# Patient Record
Sex: FEMALE | Race: ASIAN | URBAN - METROPOLITAN AREA
[De-identification: names, ages, dates, MRNs, and addresses within clinical notes are randomized per-mention and may not be internally consistent; named-entity substitution may affect disease eponyms.]

---

## 2022-10-31 ENCOUNTER — APPOINTMENT (OUTPATIENT)
Age: 47
Setting detail: DERMATOLOGY
End: 2022-10-31

## 2022-10-31 DIAGNOSIS — L57.8 OTHER SKIN CHANGES DUE TO CHRONIC EXPOSURE TO NONIONIZING RADIATION: ICD-10-CM

## 2022-10-31 DIAGNOSIS — L81.4 OTHER MELANIN HYPERPIGMENTATION: ICD-10-CM

## 2022-10-31 PROCEDURE — OTHER TREATMENT REGIMEN: OTHER

## 2022-10-31 PROCEDURE — OTHER LIQUID NITROGEN (COSMETIC): OTHER

## 2022-10-31 PROCEDURE — 99202 OFFICE O/P NEW SF 15 MIN: CPT

## 2022-10-31 PROCEDURE — OTHER COUNSELING: OTHER

## 2022-10-31 PROCEDURE — OTHER FOLLOW UP FOR NEXT VISIT: OTHER

## 2022-10-31 PROCEDURE — OTHER SUNSCREEN RECOMMENDATIONS: OTHER

## 2022-10-31 PROCEDURE — OTHER REASSURANCE: OTHER

## 2022-10-31 ASSESSMENT — LOCATION SIMPLE DESCRIPTION DERM
LOCATION SIMPLE: LEFT CHEEK
LOCATION SIMPLE: RIGHT CHEEK
LOCATION SIMPLE: LEFT ZYGOMA
LOCATION SIMPLE: LEFT FOREHEAD

## 2022-10-31 ASSESSMENT — LOCATION DETAILED DESCRIPTION DERM
LOCATION DETAILED: LEFT CENTRAL MALAR CHEEK
LOCATION DETAILED: LEFT SUPERIOR CENTRAL MALAR CHEEK
LOCATION DETAILED: LEFT INFERIOR MEDIAL FOREHEAD
LOCATION DETAILED: LEFT CENTRAL ZYGOMA
LOCATION DETAILED: RIGHT CENTRAL MALAR CHEEK

## 2022-10-31 ASSESSMENT — SEVERITY ASSESSMENT: SEVERITY: MILD

## 2022-10-31 ASSESSMENT — LOCATION ZONE DERM: LOCATION ZONE: FACE

## 2022-10-31 ASSESSMENT — BSA RASH: BSA RASH: 2

## 2022-10-31 NOTE — PROCEDURE: LIQUID NITROGEN (COSMETIC)
Render Post-Care Instructions In Note?: yes
Consent: The patient's consent was obtained including but not limited to risks of crusting, scabbing, blistering, scarring, darker or lighter pigmentary change, recurrence, incomplete removal and infection. The patient understands that the procedure is cosmetic in nature and is not covered by insurance.
Post-Care Instructions: I reviewed with the patient in detail post-care instructions. Patient is to wear sunprotection, and avoid picking at any of the treated lesions. Pt may apply Vaseline to crusted or scabbing areas.
Price (Use Numbers Only, No Special Characters Or $): 84
Detail Level: Simple
Billing Information: Bill by Static Price
Spray Paint Text: The liquid nitrogen was applied to the skin utilizing a spray paint frosting technique.
Spray Paint Technique: No

## 2022-10-31 NOTE — HPI: SKIN LESION
What Type Of Note Output Would You Prefer (Optional)?: Bullet Format
How Severe Is Your Skin Lesion?: moderate
Has Your Skin Lesion Been Treated?: not been treated
Is This A New Presentation, Or A Follow-Up?: Skin Lesions
Additional History: Patient presents for dark spots on her face.  She would like to have them evaluated and discuss possible treatment options. She has trialed topical regimens and states they lighten the lesions for a little bit but they always come back.

## 2022-10-31 NOTE — PROCEDURE: REASSURANCE
Include Location In Plan?: Yes
Detail Level: Zone
Hide Additional Notes?: No
Additional Note: Patient counseled regarding sunscreen and how to use it properly. Follow up if there are any changes.

## 2022-11-14 ENCOUNTER — APPOINTMENT (OUTPATIENT)
Age: 47
Setting detail: DERMATOLOGY
End: 2022-11-18

## 2022-11-14 DIAGNOSIS — L81.4 OTHER MELANIN HYPERPIGMENTATION: ICD-10-CM

## 2022-11-14 PROCEDURE — OTHER FOLLOW UP FOR NEXT VISIT: OTHER

## 2022-11-14 PROCEDURE — OTHER COUNSELING: OTHER

## 2022-11-14 PROCEDURE — 17110 DESTRUCT B9 LESION 1-14: CPT

## 2022-11-14 PROCEDURE — OTHER TREATMENT REGIMEN: OTHER

## 2022-11-14 PROCEDURE — OTHER LIQUID NITROGEN: OTHER

## 2022-11-14 ASSESSMENT — LOCATION ZONE DERM: LOCATION ZONE: FACE

## 2022-11-14 ASSESSMENT — LOCATION SIMPLE DESCRIPTION DERM
LOCATION SIMPLE: LEFT TEMPLE
LOCATION SIMPLE: RIGHT CHEEK
LOCATION SIMPLE: LEFT CHEEK
LOCATION SIMPLE: LEFT EYEBROW

## 2022-11-14 ASSESSMENT — LOCATION DETAILED DESCRIPTION DERM
LOCATION DETAILED: RIGHT MEDIAL MALAR CHEEK
LOCATION DETAILED: LEFT INFERIOR TEMPLE
LOCATION DETAILED: LEFT LATERAL EYEBROW
LOCATION DETAILED: LEFT CENTRAL MALAR CHEEK
LOCATION DETAILED: RIGHT LATERAL MALAR CHEEK
LOCATION DETAILED: LEFT SUPERIOR CENTRAL MALAR CHEEK
LOCATION DETAILED: RIGHT CENTRAL MALAR CHEEK

## 2022-11-14 ASSESSMENT — SEVERITY ASSESSMENT: SEVERITY: MILD

## 2022-11-14 NOTE — PROCEDURE: LIQUID NITROGEN
Spray Paint Technique: No
Duration Of Freeze Thaw-Cycle (Seconds): 4
Show Applicator Variable?: Yes
Number Of Freeze-Thaw Cycles: 1 freeze-thaw cycle
Medical Necessity Clause: This procedure was medically necessary because the lesions that were treated were: enlarging and irritated
Detail Level: Detailed
Aperture Size (Optional): D
Post-Care Instructions: I reviewed with the patient in detail post-care instructions. Patient is to wear sunprotection, and avoid picking at any of the treated lesions. Pt may apply Vaseline to crusted or scabbing areas.
Consent: The patient's consent was obtained including but not limited to risks of crusting, scabbing, blistering, scarring, darker or lighter pigmentary change, recurrence, incomplete removal and infection.
Spray Paint Text: The liquid nitrogen was applied to the skin utilizing a spray paint frosting technique.
Application Tool (Optional): Cry-AC
Medical Necessity Information: It is in your best interest to select a reason for this procedure from the list below. All of these items fulfill various CMS LCD requirements except the new and changing color options.

## 2022-11-14 NOTE — PROCEDURE: TREATMENT REGIMEN
Plan: The smaller lesions on her face were treated with LN2 in office today.  She will see how the lesions do and let the office know. A smaller tip was used on the LN2 treatment today so the dosing was very light.  If the cryotherapy does not work well I would recommend she follow up on a day where we can perform the consultation and treatment at the same time
Initiate Treatment: LN2
Detail Level: Simple

## 2022-11-28 ENCOUNTER — APPOINTMENT (OUTPATIENT)
Age: 47
Setting detail: DERMATOLOGY
End: 2022-11-28

## 2022-11-28 DIAGNOSIS — L81.4 OTHER MELANIN HYPERPIGMENTATION: ICD-10-CM

## 2022-11-28 PROCEDURE — OTHER COUNSELING: OTHER

## 2022-11-28 PROCEDURE — OTHER INVENTORY: OTHER

## 2022-11-28 PROCEDURE — OTHER FOLLOW UP FOR NEXT VISIT: OTHER

## 2022-11-28 PROCEDURE — OTHER TREATMENT REGIMEN: OTHER

## 2022-11-28 PROCEDURE — 99212 OFFICE O/P EST SF 10 MIN: CPT

## 2022-11-28 ASSESSMENT — BSA RASH: BSA RASH: 1

## 2022-11-28 ASSESSMENT — SEVERITY ASSESSMENT: SEVERITY: MILD

## 2022-11-28 NOTE — PROCEDURE: TREATMENT REGIMEN
Plan: As patient has not been seeing much improvement with cryotherapy, she will start using topical melasma emulsion 2-3 nights weekly increasing to nightly as tolerated. Also briefly discussed partial face IPL treatment as patient has enough target across cheeks.
Detail Level: Simple
Initiate Treatment: Melasma emulsion

## 2023-03-15 ENCOUNTER — APPOINTMENT (OUTPATIENT)
Age: 48
Setting detail: DERMATOLOGY
End: 2023-03-19

## 2023-03-15 DIAGNOSIS — L71.8 OTHER ROSACEA: ICD-10-CM

## 2023-03-15 PROCEDURE — OTHER FOLLOW UP FOR NEXT VISIT: OTHER

## 2023-03-15 PROCEDURE — OTHER INVENTORY: OTHER

## 2023-03-15 PROCEDURE — 99213 OFFICE O/P EST LOW 20 MIN: CPT

## 2023-03-15 PROCEDURE — OTHER COUNSELING: OTHER

## 2023-03-15 PROCEDURE — OTHER TREATMENT REGIMEN: OTHER

## 2023-03-15 ASSESSMENT — LOCATION SIMPLE DESCRIPTION DERM
LOCATION SIMPLE: LEFT CHEEK
LOCATION SIMPLE: RIGHT CHEEK

## 2023-03-15 ASSESSMENT — LOCATION DETAILED DESCRIPTION DERM
LOCATION DETAILED: RIGHT CENTRAL MALAR CHEEK
LOCATION DETAILED: LEFT CENTRAL MALAR CHEEK

## 2023-03-15 ASSESSMENT — SEVERITY ASSESSMENT OVERALL AMONG ALL PATIENTS
IN YOUR EXPERIENCE, AMONG ALL PATIENTS YOU HAVE SEEN WITH THIS CONDITION, HOW SEVERE IS THIS PATIENT'S CONDITION?: MILD TO MODERATE

## 2023-03-15 ASSESSMENT — LOCATION ZONE DERM: LOCATION ZONE: FACE

## 2023-03-15 NOTE — PROCEDURE: TREATMENT REGIMEN
Detail Level: Zone
Discontinue Regimen: Clobetasol
Initiate Treatment: Rosacea compound
Plan: We discussed this appears to be consistent with rosacea. It is unlikely that the melasma compound was the cause of this, but it could be an irritant. Patient will begin in office rosacea compound BID. We discussed that she needs to begin weaning off of the clobetasol topical. She will begin to use the clobetasol only a few times a week until she can discontinue it. She will hold the melasma compound for now until the rosacea is under control, then she can begin using it again. Patient will follow up in 1 month to update us on her progress.

## 2023-03-15 NOTE — HPI: RASH
What Type Of Note Output Would You Prefer (Optional)?: Bullet Format
Is The Patient Presenting As Previously Scheduled?: Yes
How Severe Is Your Rash?: moderate
Is This A New Presentation, Or A Follow-Up?: Rash
Additional History: Patient reports that about 2 weeks after receiving our in office melasma compound, she began to get a red rash on her face. She then saw her PCP who prescribed her clobetasol. She uses this about for a couple days and her rash clears, but then it will come back. She uses the clobetasol cream about 4-5 times a week. She continues to use the melasma compound and reports it is working very well. She reports the rash is very red and burning and can feel very hot on her face.

## 2023-04-05 ENCOUNTER — RX ONLY (RX ONLY)
Age: 48
End: 2023-04-05

## 2023-04-05 RX ORDER — METRONIDAZOLE 7.5 MG/G
THIN COAT CREAM TOPICAL BID
Qty: 45 | Refills: 1 | Status: ERX | COMMUNITY
Start: 2023-04-05

## 2023-08-21 ENCOUNTER — APPOINTMENT (OUTPATIENT)
Age: 48
Setting detail: DERMATOLOGY
End: 2023-08-23

## 2023-08-21 DIAGNOSIS — I78.8 OTHER DISEASES OF CAPILLARIES: ICD-10-CM

## 2023-08-21 DIAGNOSIS — L71.8 OTHER ROSACEA: ICD-10-CM

## 2023-08-21 PROCEDURE — OTHER COUNSELING: OTHER

## 2023-08-21 PROCEDURE — OTHER TREATMENT REGIMEN: OTHER

## 2023-08-21 PROCEDURE — 99213 OFFICE O/P EST LOW 20 MIN: CPT

## 2023-08-21 PROCEDURE — OTHER COSMETIC CONSULTATION: IPL: OTHER

## 2023-08-21 PROCEDURE — OTHER FOLLOW UP FOR NEXT VISIT: OTHER

## 2023-08-21 ASSESSMENT — LOCATION DETAILED DESCRIPTION DERM
LOCATION DETAILED: RIGHT INFERIOR CENTRAL MALAR CHEEK
LOCATION DETAILED: LEFT CENTRAL MALAR CHEEK
LOCATION DETAILED: LEFT DISTAL POSTERIOR UPPER ARM
LOCATION DETAILED: RIGHT CENTRAL MALAR CHEEK
LOCATION DETAILED: RIGHT PROXIMAL POSTERIOR UPPER ARM
LOCATION DETAILED: LEFT INFERIOR CENTRAL MALAR CHEEK

## 2023-08-21 ASSESSMENT — LOCATION SIMPLE DESCRIPTION DERM
LOCATION SIMPLE: RIGHT POSTERIOR UPPER ARM
LOCATION SIMPLE: LEFT CHEEK
LOCATION SIMPLE: LEFT POSTERIOR UPPER ARM
LOCATION SIMPLE: RIGHT CHEEK

## 2023-08-21 ASSESSMENT — LOCATION ZONE DERM
LOCATION ZONE: ARM
LOCATION ZONE: FACE

## 2023-08-21 ASSESSMENT — SEVERITY ASSESSMENT OVERALL AMONG ALL PATIENTS: IN YOUR EXPERIENCE, AMONG ALL PATIENTS YOU HAVE SEEN WITH THIS CONDITION, HOW SEVERE IS THIS PATIENT'S CONDITION?: MILD

## 2023-08-21 NOTE — PROCEDURE: TREATMENT REGIMEN
Detail Level: Zone
Plan: Patient will start with partial face IPL for $900. This will buy her a package of four treatments. We may consider doing treatment to the vessels on the arms as well, but she’s going to start with the face first.
Plan: Patient with mild telangiectasias on today’s clinical exam. However, she does describe regular and episodic flareups with increased redness. The only thing I think that might help would be IPL treatment. She will set up a series of four treatments, partial face for a total of $900. We would likely start with in motion, and maybe some very conservative stamping for her first treatment. She is aware she needs to avoid direct sunlight and increased heat in particular in the first week or two after treatment.
Detail Level: Simple

## 2023-08-25 ENCOUNTER — APPOINTMENT (OUTPATIENT)
Age: 48
Setting detail: DERMATOLOGY
End: 2023-08-26

## 2023-08-25 DIAGNOSIS — I78.8 OTHER DISEASES OF CAPILLARIES: ICD-10-CM

## 2023-08-25 PROCEDURE — OTHER FOLLOW UP FOR NEXT VISIT: OTHER

## 2023-08-25 PROCEDURE — OTHER ALMA LASER: OTHER

## 2023-08-25 PROCEDURE — OTHER TREATMENT REGIMEN: OTHER

## 2023-08-25 PROCEDURE — OTHER COUNSELING: OTHER

## 2023-08-25 ASSESSMENT — LOCATION DETAILED DESCRIPTION DERM
LOCATION DETAILED: NASAL SUPRATIP
LOCATION DETAILED: RIGHT INFERIOR CENTRAL MALAR CHEEK
LOCATION DETAILED: LEFT CENTRAL MALAR CHEEK
LOCATION DETAILED: LEFT INFERIOR CENTRAL MALAR CHEEK
LOCATION DETAILED: LEFT DISTAL POSTERIOR UPPER ARM
LOCATION DETAILED: RIGHT PROXIMAL POSTERIOR UPPER ARM
LOCATION DETAILED: RIGHT CENTRAL MALAR CHEEK

## 2023-08-25 ASSESSMENT — LOCATION SIMPLE DESCRIPTION DERM
LOCATION SIMPLE: LEFT CHEEK
LOCATION SIMPLE: RIGHT POSTERIOR UPPER ARM
LOCATION SIMPLE: LEFT POSTERIOR UPPER ARM
LOCATION SIMPLE: NOSE
LOCATION SIMPLE: RIGHT CHEEK

## 2023-08-25 ASSESSMENT — LOCATION ZONE DERM
LOCATION ZONE: ARM
LOCATION ZONE: NOSE
LOCATION ZONE: FACE

## 2023-08-25 NOTE — PROCEDURE: ALMA LASER
Er:Yag Post-Care Nail Fungus: Post care reviewed with patient.
Area In Cm^2: 1
Frequency In Hz: Unibody
Passes (Optional): 6
Pulse Mode (Optional): L
Consent: Written consent obtained, risks reviewed including but not limited to crusting, scabbing, blistering, scarring, darker or lighter pigmentary change, systemic reactions, ulceration, incidental hair removal, bruising, and/or incomplete removal.
Passes (Optional): 2
Fluence (J/Cm2): 8
Post-Care Instructions: I reviewed with the patient in detail post-care instructions. Patient should avoid sunlight and wear sun protection.
Nd:Yag Post-Care: Immediate endpoint whitening and pinpoint bleeding. Vaseline and ice applied. Post care reviewed with patient.
Plasma Intensity (%): 25
Fluence (Mj/Cm2): 4
Ipl Post-Care: Phytocorrective serum and Mineral SS applied. Post care reviewed with patient.
Frequency (Optional): 2 Hz
Pulse Width: 30 ms
Energy In Kj: 50
Energy In Mj: 400
Energy (Mj/P): 600
Total Energy (Kj): 6.1
Pulse Duration In Ms: 15
Price (Use Numbers Only, No Special Characters Or $): 705
Spot Size In Mm: 3
Detail Level: Simple
Fluence (J/Cm2): 30
Post-Care To Use?: Generic
Clear Lift Post-Care: Post care reviewed with patient. Patient should avoid direct sunlight and wear broad spectrum sunscreen daily.

## 2023-08-25 NOTE — PROCEDURE: COUNSELING
Patient Specific Counseling (Will Not Stick From Patient To Patient): We discussed the risks and benefits of intense pulsed light treatment including but not limited to lightening or darkening of treated skin, crusting, scabbing, scarring, reactivation of cold sores, infection, redness,  accidental hair removal, swelling and bruising. Strict sun avoidance emphasized. Patient understands that multiple treatments may be necessary, and not all unwanted brown spots can be removed. Patient aware of alternatives to IPL, including q switched lasers, bleaching creams, chemical peels, retinoids, cryotherapy, and fraxel. Patient understands that the treatment is cosmetic in nature and not covered by insurance.
Detail Level: Simple

## 2023-08-25 NOTE — PROCEDURE: TREATMENT REGIMEN
Plan: Partial Face IPL #1. Patient tolerated the procedure well with minimal discomfort. We treated both cheeks in motion as well as two passes with stamping. We treated the nose area with only stamping.  In Motion: 30s intervals, Fluence 2.6 J, Total energy per cheek 2.1KJ. Cooling 100%. Stamping: Fluence 6J, PW 15, Cooling 100%. 2 passes to cheeks and nose. Follow up in 4 weeks for IPL #2
Detail Level: Simple

## 2023-09-22 ENCOUNTER — APPOINTMENT (OUTPATIENT)
Age: 48
Setting detail: DERMATOLOGY
End: 2023-09-22

## 2023-09-22 DIAGNOSIS — I78.8 OTHER DISEASES OF CAPILLARIES: ICD-10-CM

## 2023-09-22 PROCEDURE — OTHER TREATMENT REGIMEN: OTHER

## 2023-09-22 PROCEDURE — OTHER ALMA LASER: OTHER

## 2023-09-22 PROCEDURE — OTHER FOLLOW UP FOR NEXT VISIT: OTHER

## 2023-09-22 PROCEDURE — OTHER COUNSELING: OTHER

## 2023-09-22 ASSESSMENT — LOCATION DETAILED DESCRIPTION DERM
LOCATION DETAILED: RIGHT CHIN
LOCATION DETAILED: LEFT CENTRAL MALAR CHEEK
LOCATION DETAILED: LEFT ANTERIOR EARLOBE
LOCATION DETAILED: GLABELLA
LOCATION DETAILED: RIGHT CENTRAL MALAR CHEEK
LOCATION DETAILED: NASAL SUPRATIP
LOCATION DETAILED: RIGHT INFERIOR CENTRAL MALAR CHEEK
LOCATION DETAILED: LEFT INFERIOR CENTRAL MALAR CHEEK

## 2023-09-22 ASSESSMENT — LOCATION ZONE DERM
LOCATION ZONE: NOSE
LOCATION ZONE: FACE
LOCATION ZONE: EAR

## 2023-09-22 ASSESSMENT — LOCATION SIMPLE DESCRIPTION DERM
LOCATION SIMPLE: GLABELLA
LOCATION SIMPLE: RIGHT CHEEK
LOCATION SIMPLE: LEFT EAR
LOCATION SIMPLE: NOSE
LOCATION SIMPLE: CHIN
LOCATION SIMPLE: LEFT CHEEK

## 2023-09-22 NOTE — PROCEDURE: ALMA LASER
Power (Balderas): 1
Power In Balderas: 50
Plasma Intensity (%): 25
Frequency (Optional): 2 Hz
Pulse Width: 30 ms
Spot Size In Mm: 3
Er:Yag Post-Care Tattoo: Post care reviewed with patient.
Treatment Number: 2
Consent: Written consent obtained, risks reviewed including but not limited to crusting, scabbing, blistering, scarring, darker or lighter pigmentary change, systemic reactions, ulceration, incidental hair removal, bruising, and/or incomplete removal.
Donnie Post-Care: Immediate endpoint whitening and pinpoint bleeding. Vaseline and ice applied. Post care reviewed with patient.
Ipl Post-Care: Phytocorrective serum and Mineral SS applied. Post care reviewed with patient.
Fluence (J/Cm2): 30
Fluence (J/Cm2): 8
Price (Use Numbers Only, No Special Characters Or $): 989
Energy (Mj/P): 600
Frequency In Hz: Unibody
Fluence (Mj/Cm2): 4
Pulse Duration In Ms: 15
Post-Care Instructions: I reviewed with the patient in detail post-care instructions. Patient should avoid sunlight and wear sun protection.
Passes (Optional): 6
Total Energy (Kj): 6.1
Post-Care To Use?: Generic
Detail Level: Simple
Energy In Mj: 400
Pulse Mode (Optional): L
Clear Lift Post-Care: Post care reviewed with patient. Patient should avoid direct sunlight and wear broad spectrum sunscreen daily.

## 2023-09-22 NOTE — PROCEDURE: COUNSELING
Detail Level: Simple
Patient Specific Counseling (Will Not Stick From Patient To Patient): We discussed the risks and benefits of intense pulsed light treatment including but not limited to lightening or darkening of treated skin, crusting, scabbing, scarring, reactivation of cold sores, infection, redness,  accidental hair removal, swelling and bruising. Strict sun avoidance emphasized. Patient understands that multiple treatments may be necessary, and not all unwanted brown spots can be removed. Patient aware of alternatives to IPL, including q switched lasers, bleaching creams, chemical peels, retinoids, cryotherapy, and fraxel. Patient understands that the treatment is cosmetic in nature and not covered by insurance.

## 2023-09-22 NOTE — PROCEDURE: TREATMENT REGIMEN
Detail Level: Simple
Plan: Partial Face IPL # 2.  Patient tolerated the procedure well with minimal discomfort. We treated both cheeks in motion as well as two passes with stamping. We treated the nose area with only stamping.  In Motion: 30s intervals, Fluence 3.0 J, Total energy per cheek 2.1KJ. Cooling 100%. Stamping: Fluence 7J, PW 15, Cooling 100%. 2 passes to cheeks and nose, glabella and left ear lobe. Follow up in 4 weeks for IPL # 3 on 10/30/23

## 2023-10-20 ENCOUNTER — APPOINTMENT (OUTPATIENT)
Age: 48
Setting detail: DERMATOLOGY
End: 2023-11-17

## 2023-10-20 DIAGNOSIS — I78.8 OTHER DISEASES OF CAPILLARIES: ICD-10-CM

## 2023-10-20 PROCEDURE — OTHER FOLLOW UP FOR NEXT VISIT: OTHER

## 2023-10-20 PROCEDURE — OTHER COUNSELING: OTHER

## 2023-10-20 PROCEDURE — OTHER TREATMENT REGIMEN: OTHER

## 2023-10-20 PROCEDURE — OTHER ALMA LASER: OTHER

## 2023-10-20 ASSESSMENT — LOCATION ZONE DERM
LOCATION ZONE: FACE
LOCATION ZONE: NOSE
LOCATION ZONE: EAR

## 2023-10-20 ASSESSMENT — LOCATION DETAILED DESCRIPTION DERM
LOCATION DETAILED: NASAL SUPRATIP
LOCATION DETAILED: RIGHT CHIN
LOCATION DETAILED: LEFT INFERIOR CENTRAL MALAR CHEEK
LOCATION DETAILED: RIGHT CENTRAL MALAR CHEEK
LOCATION DETAILED: GLABELLA
LOCATION DETAILED: LEFT ANTERIOR EARLOBE
LOCATION DETAILED: RIGHT INFERIOR CENTRAL MALAR CHEEK
LOCATION DETAILED: LEFT CENTRAL MALAR CHEEK

## 2023-10-20 ASSESSMENT — LOCATION SIMPLE DESCRIPTION DERM
LOCATION SIMPLE: LEFT CHEEK
LOCATION SIMPLE: CHIN
LOCATION SIMPLE: LEFT EAR
LOCATION SIMPLE: RIGHT CHEEK
LOCATION SIMPLE: GLABELLA
LOCATION SIMPLE: NOSE

## 2023-10-20 NOTE — PROCEDURE: TREATMENT REGIMEN
Plan: Partial Face IPL # 3. Patient tolerated the procedure well with minimal discomfort. We treated both cheeks in motion as well as two passes with stamping. We treated the nose area with only stamping.  In Motion: 30s intervals, Fluence 3.0 J, Total energy per cheek 2.1KJ. Cooling 100%. Stamping: Fluence 7J, PW 15, Cooling 100%. 2 passes to cheeks and nose, glabella and left ear lobe.
Detail Level: Simple

## 2023-10-20 NOTE — PROCEDURE: ALMA LASER
Er:Yag Post-Care Nail Fungus: Post care reviewed with patient.
Treatment Number: 1
Energy (Mj/P): 600
Post-Care Instructions: I reviewed with the patient in detail post-care instructions. Patient should avoid sunlight and wear sun protection.
Energy In Kj: 50
Plasma Intensity (%): 25
Passes (Optional): 6
Pulse Duration In Ms: 15
Pulse Mode (Optional): L
Fluence (J/Cm2): 30
Fluence (Mj/Cm2): 4
Price (Use Numbers Only, No Special Characters Or $): 195
Clear Lift Post-Care: Post care reviewed with patient. Patient should avoid direct sunlight and wear broad spectrum sunscreen daily.
Donnie Post-Care: Immediate endpoint whitening and pinpoint bleeding. Vaseline and ice applied. Post care reviewed with patient.
Consent: Written consent obtained, risks reviewed including but not limited to crusting, scabbing, blistering, scarring, darker or lighter pigmentary change, systemic reactions, ulceration, incidental hair removal, bruising, and/or incomplete removal.
Frequency (Optional): 2 Hz
Spot Size In Mm: 3
Total Energy (Kj): 6.1
Fluence (J/Cm2): 8
Ipl Post-Care: Phytocorrective serum and Mineral SS applied. Post care reviewed with patient.
Energy In Mj: 400
Frequency In Hz: Unibody
Post-Care To Use?: Generic
Detail Level: Simple
Passes (Optional): 2
Pulse Width: 30 ms

## 2023-11-17 ENCOUNTER — APPOINTMENT (OUTPATIENT)
Age: 48
Setting detail: DERMATOLOGY
End: 2023-11-17

## 2023-11-17 DIAGNOSIS — I78.8 OTHER DISEASES OF CAPILLARIES: ICD-10-CM

## 2023-11-17 PROCEDURE — OTHER TREATMENT REGIMEN: OTHER

## 2023-11-17 PROCEDURE — OTHER FOLLOW UP FOR NEXT VISIT: OTHER

## 2023-11-17 PROCEDURE — OTHER ALMA LASER: OTHER

## 2023-11-17 PROCEDURE — OTHER COUNSELING: OTHER

## 2023-11-17 ASSESSMENT — LOCATION ZONE DERM
LOCATION ZONE: FACE
LOCATION ZONE: EAR
LOCATION ZONE: NOSE

## 2023-11-17 ASSESSMENT — LOCATION DETAILED DESCRIPTION DERM
LOCATION DETAILED: NASAL SUPRATIP
LOCATION DETAILED: GLABELLA
LOCATION DETAILED: RIGHT CENTRAL MALAR CHEEK
LOCATION DETAILED: LEFT CENTRAL MALAR CHEEK
LOCATION DETAILED: RIGHT CHIN
LOCATION DETAILED: LEFT ANTERIOR EARLOBE
LOCATION DETAILED: LEFT INFERIOR CENTRAL MALAR CHEEK
LOCATION DETAILED: RIGHT INFERIOR CENTRAL MALAR CHEEK

## 2023-11-17 ASSESSMENT — LOCATION SIMPLE DESCRIPTION DERM
LOCATION SIMPLE: GLABELLA
LOCATION SIMPLE: RIGHT CHEEK
LOCATION SIMPLE: NOSE
LOCATION SIMPLE: CHIN
LOCATION SIMPLE: LEFT CHEEK
LOCATION SIMPLE: LEFT EAR

## 2023-11-17 NOTE — PROCEDURE: ALMA LASER
Frequency In Hz: Unibody
Repetition Rate (Hz): 1
Clear Lift Post-Care: Post care reviewed with patient. Patient should avoid direct sunlight and wear broad spectrum sunscreen daily.
Uv Post-Care: Post care reviewed with patient.
Power In Balderas: 50
Consent: Written consent obtained, risks reviewed including but not limited to crusting, scabbing, blistering, scarring, darker or lighter pigmentary change, systemic reactions, ulceration, incidental hair removal, bruising, and/or incomplete removal.
Energy In Mj: 400
Frequency (Optional): 2 Hz
Price (Use Numbers Only, No Special Characters Or $): 408
Donnie Post-Care: Immediate endpoint whitening and pinpoint bleeding. Vaseline and ice applied. Post care reviewed with patient.
Pulse Width: 30 ms
Energy (Mj/P): 600
Plasma Intensity (%): 25
Ipl Post-Care: Phytocorrective serum and Mineral SS applied. Post care reviewed with patient.
Treatment Number: 4
Detail Level: Simple
Total Energy (Kj): 6.1
Pulse Duration In Ms: 15
Post-Care Instructions: I reviewed with the patient in detail post-care instructions. Patient should avoid sunlight and wear sun protection.
Post-Care To Use?: Generic
Pulse Mode (Optional): L
Passes (Optional): 2
Fluence (J/Cm2): 8
Fluence (J/Cm2): 30
Passes (Optional): 6
Spot Size In Mm: 3

## 2023-11-17 NOTE — PROCEDURE: TREATMENT REGIMEN
Detail Level: Simple
Plan: Partial Face IPL # 4. Patient tolerated the procedure well with minimal discomfort. We treated both cheeks in motion as well as two passes with stamping. We treated the nose area with only stamping.  In Motion: 30s intervals, Fluence 3.0 J, Total energy per cheek 2.1KJ. Cooling 100%. Stamping: Fluence 7J, PW 15, Cooling 100%. 2 passes to cheeks and nose, glabella and left ear lobe

## 2024-08-09 ENCOUNTER — APPOINTMENT (OUTPATIENT)
Age: 49
Setting detail: DERMATOLOGY
End: 2024-08-21

## 2024-08-09 DIAGNOSIS — I78.8 OTHER DISEASES OF CAPILLARIES: ICD-10-CM

## 2024-08-09 PROCEDURE — OTHER FOLLOW UP FOR NEXT VISIT: OTHER

## 2024-08-09 PROCEDURE — OTHER COUNSELING: OTHER

## 2024-08-09 PROCEDURE — OTHER TREATMENT REGIMEN: OTHER

## 2024-08-09 PROCEDURE — OTHER ALMA LASER: OTHER

## 2024-08-09 ASSESSMENT — LOCATION DETAILED DESCRIPTION DERM
LOCATION DETAILED: RIGHT CHIN
LOCATION DETAILED: GLABELLA
LOCATION DETAILED: RIGHT INFERIOR CENTRAL MALAR CHEEK
LOCATION DETAILED: LEFT ANTERIOR EARLOBE
LOCATION DETAILED: LEFT CENTRAL MALAR CHEEK
LOCATION DETAILED: LEFT INFERIOR CENTRAL MALAR CHEEK
LOCATION DETAILED: RIGHT CENTRAL MALAR CHEEK
LOCATION DETAILED: NASAL SUPRATIP

## 2024-08-09 ASSESSMENT — LOCATION SIMPLE DESCRIPTION DERM
LOCATION SIMPLE: NOSE
LOCATION SIMPLE: LEFT CHEEK
LOCATION SIMPLE: GLABELLA
LOCATION SIMPLE: LEFT EAR
LOCATION SIMPLE: CHIN
LOCATION SIMPLE: RIGHT CHEEK

## 2024-08-09 ASSESSMENT — LOCATION ZONE DERM
LOCATION ZONE: NOSE
LOCATION ZONE: FACE
LOCATION ZONE: EAR

## 2024-08-09 NOTE — PROCEDURE: ALMA LASER
Pulse Width: 30 ms
Treatment Number: 1
Clear Lift Post-Care: Post care reviewed with patient. Patient should avoid direct sunlight and wear broad spectrum sunscreen daily.
Post-Care Instructions: I reviewed with the patient in detail post-care instructions. Patient should avoid sunlight and wear sun protection.
Fluence (J/Cm2): 30
Nd:Yag Post-Care: Immediate endpoint whitening and pinpoint bleeding. Vaseline and ice applied. Post care reviewed with patient.
Energy In Kj: 50
Pulse Duration In Ms: 15
Post-Care To Use?: Generic
Er:Yag Post-Care Generic: Post care reviewed with patient.
Passes (Optional): 6
Energy In Mj: 400
Frequency (Optional): 2 Hz
Consent: Written consent obtained, risks reviewed including but not limited to crusting, scabbing, blistering, scarring, darker or lighter pigmentary change, systemic reactions, ulceration, incidental hair removal, bruising, and/or incomplete removal.
Price (Use Numbers Only, No Special Characters Or $): 200
Spot Size In Mm: 3
Fluence (J/Cm2): 8
Fluence (Mj/Cm2): 4
Frequency In Hz: Unibody
Ipl Post-Care: Phytocorrective serum and Mineral SS applied. Post care reviewed with patient.
Treatment Number: 5
Detail Level: Simple
Pulse Mode (Optional): L
Plasma Intensity (%): 25
Total Energy (Kj): 6.1
Passes (Optional): 2
Energy (Mj/P): 600

## 2024-08-09 NOTE — PROCEDURE: TREATMENT REGIMEN
Detail Level: Zone
Plan: Partial Face IPL # 5.  Patient tolerated the procedure well with minimal discomfort. We treated both cheeks in motion as well as two passes with stamping. We treated the nose area with only stamping.  In Motion: 30s intervals, Fluence 3.0 J, Total energy per cheek 2.1KJ. Cooling 100%. Stamping: Fluence 7J, PW 15, Cooling 100%. 2 passes to cheeks and nose, glabella and left ear lobe

## 2024-09-06 ENCOUNTER — APPOINTMENT (OUTPATIENT)
Age: 49
Setting detail: DERMATOLOGY
End: 2024-09-07

## 2024-09-06 DIAGNOSIS — I78.8 OTHER DISEASES OF CAPILLARIES: ICD-10-CM

## 2024-09-06 PROCEDURE — OTHER ALMA LASER: OTHER

## 2024-09-06 PROCEDURE — OTHER COUNSELING: OTHER

## 2024-09-06 PROCEDURE — OTHER FOLLOW UP FOR NEXT VISIT: OTHER

## 2024-09-06 PROCEDURE — OTHER TREATMENT REGIMEN: OTHER

## 2024-09-06 ASSESSMENT — LOCATION SIMPLE DESCRIPTION DERM
LOCATION SIMPLE: GLABELLA
LOCATION SIMPLE: CHIN
LOCATION SIMPLE: NOSE
LOCATION SIMPLE: RIGHT CHEEK
LOCATION SIMPLE: LEFT CHEEK
LOCATION SIMPLE: LEFT EAR

## 2024-09-06 ASSESSMENT — LOCATION ZONE DERM
LOCATION ZONE: EAR
LOCATION ZONE: FACE
LOCATION ZONE: NOSE

## 2024-09-06 ASSESSMENT — LOCATION DETAILED DESCRIPTION DERM
LOCATION DETAILED: LEFT ANTERIOR EARLOBE
LOCATION DETAILED: RIGHT CHIN
LOCATION DETAILED: NASAL SUPRATIP
LOCATION DETAILED: LEFT CENTRAL MALAR CHEEK
LOCATION DETAILED: RIGHT CENTRAL MALAR CHEEK
LOCATION DETAILED: LEFT INFERIOR CENTRAL MALAR CHEEK
LOCATION DETAILED: GLABELLA
LOCATION DETAILED: RIGHT INFERIOR CENTRAL MALAR CHEEK

## 2024-09-06 NOTE — PROCEDURE: ALMA LASER
Pulse Width: 30 ms
Treatment Number: 1
Clear Lift Post-Care: Post care reviewed with patient. Patient should avoid direct sunlight and wear broad spectrum sunscreen daily.
Post-Care Instructions: I reviewed with the patient in detail post-care instructions. Patient should avoid sunlight and wear sun protection.
Fluence (J/Cm2): 30
Nd:Yag Post-Care: Immediate endpoint whitening and pinpoint bleeding. Vaseline and ice applied. Post care reviewed with patient.
Energy In Kj: 50
Pulse Duration In Ms: 15
Post-Care To Use?: Generic
Er:Yag Post-Care Generic: Post care reviewed with patient.
Passes (Optional): 6
Energy In Mj: 400
Frequency (Optional): 2 Hz
Consent: Written consent obtained, risks reviewed including but not limited to crusting, scabbing, blistering, scarring, darker or lighter pigmentary change, systemic reactions, ulceration, incidental hair removal, bruising, and/or incomplete removal.
Price (Use Numbers Only, No Special Characters Or $): 200
Spot Size In Mm: 3
Fluence (J/Cm2): 8
Fluence (Mj/Cm2): 4
Frequency In Hz: Unibody
Ipl Post-Care: Phytocorrective serum and Mineral SS applied. Post care reviewed with patient.
Detail Level: Simple
Pulse Mode (Optional): L
Plasma Intensity (%): 25
Total Energy (Kj): 6.1
Passes (Optional): 2
Energy (Mj/P): 600

## 2024-09-06 NOTE — PROCEDURE: TREATMENT REGIMEN
Detail Level: Zone
Plan: Partial Face IPL # 6. Patient tolerated the procedure well with minimal discomfort. We treated both cheeks in motion as well as two passes with stamping. We treated the nose area with only stamping.  In Motion: 30s intervals, Fluence 3.4 J, Total energy per cheek 2.1KJ. Cooling 100%. Stamping: Fluence 8 J, PW 15, Cooling 100%. 2 passes to cheeks and nose, glabella and left ear lobe

## 2024-11-01 ENCOUNTER — APPOINTMENT (OUTPATIENT)
Age: 49
Setting detail: DERMATOLOGY
End: 2024-11-06

## 2024-11-01 DIAGNOSIS — I78.8 OTHER DISEASES OF CAPILLARIES: ICD-10-CM

## 2024-11-01 PROCEDURE — OTHER TREATMENT REGIMEN: OTHER

## 2024-11-01 PROCEDURE — OTHER COUNSELING: OTHER

## 2024-11-01 PROCEDURE — OTHER FOLLOW UP FOR NEXT VISIT: OTHER

## 2024-11-01 PROCEDURE — OTHER ALMA LASER: OTHER

## 2024-11-01 ASSESSMENT — LOCATION DETAILED DESCRIPTION DERM
LOCATION DETAILED: LEFT CENTRAL MALAR CHEEK
LOCATION DETAILED: LEFT INFERIOR CENTRAL MALAR CHEEK
LOCATION DETAILED: LEFT ANTERIOR EARLOBE
LOCATION DETAILED: RIGHT INFERIOR CENTRAL MALAR CHEEK
LOCATION DETAILED: RIGHT CENTRAL MALAR CHEEK
LOCATION DETAILED: GLABELLA
LOCATION DETAILED: NASAL SUPRATIP
LOCATION DETAILED: RIGHT CHIN

## 2024-11-01 ASSESSMENT — LOCATION ZONE DERM
LOCATION ZONE: EAR
LOCATION ZONE: FACE
LOCATION ZONE: NOSE

## 2024-11-01 ASSESSMENT — LOCATION SIMPLE DESCRIPTION DERM
LOCATION SIMPLE: NOSE
LOCATION SIMPLE: GLABELLA
LOCATION SIMPLE: LEFT CHEEK
LOCATION SIMPLE: LEFT EAR
LOCATION SIMPLE: CHIN
LOCATION SIMPLE: RIGHT CHEEK

## 2024-11-01 NOTE — PROCEDURE: TREATMENT REGIMEN
Detail Level: Zone
Plan: Partial Face IPL # 7. Patient tolerated the procedure well with minimal discomfort. We treated both cheeks and chin with in motion as well as two passes with stamping. We treated the nose area with only stamping.  \\n\\Jazmin Motion: 30s intervals, Fluence 3.6 J, Total energy for both cheeks and chin was 3.6 KJ evenly divided. Cooling 100%. \\n\\nStamping: Fluence 9 J, PW 15, Cooling 100%. 2 passes to cheeks and nose, glabella and left ear lobe

## 2024-11-01 NOTE — PROCEDURE: ALMA LASER
Pulse Width: 30 ms
Treatment Number: 1
Clear Lift Post-Care: Post care reviewed with patient. Patient should avoid direct sunlight and wear broad spectrum sunscreen daily.
Post-Care Instructions: I reviewed with the patient in detail post-care instructions. Patient should avoid sunlight and wear sun protection.
Fluence (J/Cm2): 30
Nd:Yag Post-Care: Immediate endpoint whitening and pinpoint bleeding. Vaseline and ice applied. Post care reviewed with patient.
Energy In Kj: 50
Pulse Duration In Ms: 15
Post-Care To Use?: Generic
Er:Yag Post-Care Generic: Post care reviewed with patient.
Passes (Optional): 6
Energy In Mj: 400
Frequency (Optional): 2 Hz
Consent: Written consent obtained, risks reviewed including but not limited to crusting, scabbing, blistering, scarring, darker or lighter pigmentary change, systemic reactions, ulceration, incidental hair removal, bruising, and/or incomplete removal.
Price (Use Numbers Only, No Special Characters Or $): 200
Spot Size In Mm: 3
Fluence (J/Cm2): 8
Fluence (Mj/Cm2): 4
Frequency In Hz: Unibody
Ipl Post-Care: Phytocorrective serum and Mineral SS applied. Post care reviewed with patient.
Treatment Number: 7
Detail Level: Simple
Pulse Mode (Optional): L
Plasma Intensity (%): 25
Total Energy (Kj): 6.1
Passes (Optional): 2
Energy (Mj/P): 600

## 2025-06-20 NOTE — PROCEDURE: TREATMENT REGIMEN
Initiate Treatment: LN
Detail Level: Simple
Plan: We discussed treatment options including topical regimens vs IPL vs cryotherapy. Four lesions were treated with cryotherapy in office today. She will see how she responds to this and call back in the next week or two to discuss treating the other lesions. If this does not lead to resolution, IPL may be needed to treat the remaining lesions.  I thought based on the fact that these lesions are small and fairly well defined the cryotherapy would work well. However, if the results are not to her liking IPL would be my next recommendation. We may end up freezing some of the smaller lesions and treating the larger one on the right cheek that she’s had for most of her life with a IPL. If the cryotherapy does not work well I would recommend she follow up on a day where we can perform the consultation and treatment at the same time
OT eval and treat